# Patient Record
Sex: MALE | NOT HISPANIC OR LATINO
[De-identification: names, ages, dates, MRNs, and addresses within clinical notes are randomized per-mention and may not be internally consistent; named-entity substitution may affect disease eponyms.]

---

## 2020-05-04 ENCOUNTER — APPOINTMENT (OUTPATIENT)
Dept: NEPHROLOGY | Facility: CLINIC | Age: 50
End: 2020-05-04
Payer: COMMERCIAL

## 2020-05-04 DIAGNOSIS — Z82.3 FAMILY HISTORY OF STROKE: ICD-10-CM

## 2020-05-04 DIAGNOSIS — S86.819A STRAIN OF OTHER MUSCLE(S) AND TENDON(S) AT LOWER LEG LEVEL, UNSPECIFIED LEG, INITIAL ENCOUNTER: ICD-10-CM

## 2020-05-04 DIAGNOSIS — Z72.89 OTHER PROBLEMS RELATED TO LIFESTYLE: ICD-10-CM

## 2020-05-04 DIAGNOSIS — Z87.891 PERSONAL HISTORY OF NICOTINE DEPENDENCE: ICD-10-CM

## 2020-05-04 PROCEDURE — 99204 OFFICE O/P NEW MOD 45 MIN: CPT | Mod: 95

## 2020-05-04 RX ORDER — ELECTROLYTES/DEXTROSE
SOLUTION, ORAL ORAL
Refills: 0 | Status: ACTIVE | COMMUNITY

## 2020-05-04 NOTE — ASSESSMENT
[FreeTextEntry1] : Bib Galvin is a 49 year old  male with a history of focal segmental glomerulosclerosis diagnosed by biopsy at around age 15 when he was noted to have proteinuria. His mother opted for a holistic approach.  He was introduced to Dr. Ugo Lane who modified his diet and added supplements.  The proteinuria improved.  Recently he has been having mildly elevated blood pressures.  His first blood pressure tends to run around 140-150/.  Upon repeat it for the third time the pressure decreases to around 135/90.  He does not limit his salt intake.  He has backed off of his exercise regimen since the beginnings of the COVID pandemic.  His BMI is around 27 kg per meter square.\par \par LABS \par \par (11/11/21019)  BUN/creatinine 19/1.2, eGGFR > 60 mL/minute, urinalysis --> 30 mg/dL albumin\par \par (12/09/2019)  24 hour urine collection --> volume 2150 mL, 494 mg/24 h, creatinine 2.9 g/24 h\par \par IMPRESSION:\par \par (1) Mildly elevated blood pressures\par (2) Proteinuria < 0.5 grams\par (3) Overweight?\par \par RECOMMENDATIONS:\par \par (1) I think that Mr. Galvin would best be suited to lifestyle modifications.  I asked him to get back to his regular exercise routine and remove salt from his diet.  In addition, I explained that his BMI of 27 kg./m2 is above normal and commented that I was unsure whether this was due to a substantial musculature or not.  I commented that given that his is a teleconference he is probably better suited to decide whether he needs to lose weight.\par (2) Mr. Galvin will follow his twice daily blood pressures, taking them twice if he does not meet a goal of 130/80.\par (3) We talked about the proteinuria.  I explained that close to 1/2 gram is above normal and commented that the lifestyle changes might very well correct this. I am not adding an ACE inhibitor or ARB at this time. \par \par I will see Mr. Galvin again for follow up in around one month.  At that time I will order lab studies.  I am okay with submitting a random urine for total protein and creatinine when the amount of proteinuria is checked. \par \par The time spent in this teleconference was 45 minutes.\par \par \par

## 2020-05-04 NOTE — HISTORY OF PRESENT ILLNESS
[Medical Office: ([facility])___] : at the medical office located in  [Home] : at home, [unfilled] , at the time of the visit. [Patient] : the patient [FreeTextEntry4] : Lorna Addison [FreeTextEntry1] : Bib Galvin is a 49 year old  male who is currently being referred because of hypertension and recently increasing blood pressures.  He has a history of chronic kidney disease and underwent a renal biopsy when he was about 13-14 years old because of proteinuria.  He was told he had focal glomerulosclerosis.  He opted for a holistic approach and self treated with diet and nutritional supplements.  The proteinuria decreased.  He uses no NSAIDS.  He avoids white flour, limits sugar in his diet, and eats a large vegetable diet.  His weight has been steady, fluctuating between 210 and 215 pounds.  He is 6 foot 2 inches tall.  His blood pressures initially run around 140-150/.  He takes his blood pressure 3 times.  The final measurement read 135/91.  These last measurements were done on 04/12/2020.\par \par He exercises 2-3 times per week (cardio, basketball, weight lifting) and running when not being quarantined due to COVID.  He tries to limit salt in his diet "but not extensively".  He saw Dr. Fazal Benedict on one or two occasions. He then saw Dr. Sher.

## 2020-05-04 NOTE — REVIEW OF SYSTEMS
[Nocturia] : nocturia [Negative] : Neurological [Hesitancy] : no urinary hesitancy [Dysuria] : no dysuria [FreeTextEntry2] : "there a points in time where I'll feel a little run down" [FreeTextEntry8] : nocturia x 1-2, usually once.  [FreeTextEntry9] : Occasional shoulder pain (had some issues and did some rehab), some old sports injuries "that pop up from time to time" [de-identified] : n [de-identified] : no polydipsia, polyphagia, feelings of being very hot or cold

## 2020-05-04 NOTE — CONSULT LETTER
[Dear  ___] : Dear  [unfilled], [Consult Letter:] : I had the pleasure of evaluating your patient, [unfilled]. [Please see my note below.] : Please see my note below. [Consult Closing:] : Thank you very much for allowing me to participate in the care of this patient.  If you have any questions, please do not hesitate to contact me. [Sincerely,] : Sincerely, [FreeTextEntry3] : Khoa

## 2021-02-04 ENCOUNTER — APPOINTMENT (OUTPATIENT)
Dept: NEPHROLOGY | Facility: CLINIC | Age: 51
End: 2021-02-04
Payer: COMMERCIAL

## 2021-02-04 VITALS
SYSTOLIC BLOOD PRESSURE: 132 MMHG | DIASTOLIC BLOOD PRESSURE: 84 MMHG | BODY MASS INDEX: 26.31 KG/M2 | RESPIRATION RATE: 16 BRPM | WEIGHT: 205 LBS | TEMPERATURE: 98 F | HEART RATE: 61 BPM | OXYGEN SATURATION: 98 % | HEIGHT: 74 IN

## 2021-02-04 VITALS — SYSTOLIC BLOOD PRESSURE: 140 MMHG | DIASTOLIC BLOOD PRESSURE: 84 MMHG

## 2021-02-04 PROCEDURE — 99072 ADDL SUPL MATRL&STAF TM PHE: CPT

## 2021-02-04 PROCEDURE — 99214 OFFICE O/P EST MOD 30 MIN: CPT

## 2021-02-04 NOTE — PHYSICAL EXAM
[General Appearance - Alert] : alert [General Appearance - In No Acute Distress] : in no acute distress [Sclera] : the sclera and conjunctiva were normal [Extraocular Movements] : extraocular movements were intact [Neck Appearance] : the appearance of the neck was normal [] : no respiratory distress [Respiration, Rhythm And Depth] : normal respiratory rhythm and effort [Exaggerated Use Of Accessory Muscles For Inspiration] : no accessory muscle use [Auscultation Breath Sounds / Voice Sounds] : lungs were clear to auscultation bilaterally [Heart Rate And Rhythm] : heart rate was normal and rhythm regular [Heart Sounds] : normal S1 and S2 [Heart Sounds Gallop] : no gallops [Murmurs] : no murmurs [Heart Sounds Pericardial Friction Rub] : no pericardial rub [Edema] : there was no peripheral edema [Bowel Sounds] : normal bowel sounds [Abdomen Soft] : soft [Abdomen Tenderness] : non-tender [Abnormal Walk] : normal gait [Skin Color & Pigmentation] : normal skin color and pigmentation [Skin Turgor] : normal skin turgor [Oriented To Time, Place, And Person] : oriented to person, place, and time [Impaired Insight] : insight and judgment were intact [Affect] : the affect was normal [Mood] : the mood was normal

## 2021-02-04 NOTE — ASSESSMENT
[FreeTextEntry1] : Bib Galvin is a 50 year old  male with a history of focal segmental glomerulosclerosis diagnosed by biopsy at around age 15 when he was noted to have proteinuria. His mother opted for a holistic approach.  He was introduced to Dr. Ugo Lane who modified his diet and added supplements.  The proteinuria improved.  He is here regarding his FSGS and hypertension.  Today's blood pressure (taken by MD is 140/84.  The other blood pressure was taken over a shirt.  His blood pressure is mildly elevated.  Sometimes it is higher at home.  His BMI is 26.32 kg per meter square though he is thin and very muscular.\par \par LABS \par \par (11/11/21019)  BUN/creatinine 19/1.2, eGFR > 60 mL/minute, urinalysis --> 30 mg/dL albumin\par \par (12/09/2019)  24 hour urine collection --> volume 2150 mL, 494 mg/24 h, creatinine 2.9 g/24 h\par \par (01/20/2021)  Urinalysis without blood, trace protein is present. BUN/creatinine levels are 17/1.2. \par \par IMPRESSION:\par \par (1) Mildly elevated blood pressures\par (2) Proteinuria < 0.5 gram in the past.  No recent urine protein to creatinine or albumin to creatinine ratio.\par \par RECOMMENDATIONS:\par \par (1) I think that Mr. Galvin would best be suited to lifestyle modifications.  I asked him to get back to his regular exercise routine and remove salt from his diet. \par (2) 24-hour urine for total protein will be done in the near future. \par (3) i am no medications at this time.  If there is any proteinuria, Mr. Galvin might do well on an ARB for renal protection. \par \par I will call Mr. Galvin with the result of the 24 hour urine.\par \par \par

## 2021-02-04 NOTE — REVIEW OF SYSTEMS
[Nocturia] : nocturia [Feeling Tired] : feeling tired [Negative] : Musculoskeletal [Dysuria] : no dysuria [Hesitancy] : no urinary hesitancy [FreeTextEntry2] : Tired (though not today),  not getting enough sleep [FreeTextEntry8] : nocturia x 1-2, usually once.

## 2021-02-04 NOTE — HISTORY OF PRESENT ILLNESS
[FreeTextEntry1] : Bib Galvin is a 50 year old  male who I saw in consult on 05/04/2020 because of hypertension and recently increasing blood pressures.  He reported a history of chronic kidney disease and underwent a renal biopsy when he was about 13-14 years old because of proteinuria.  He was told he had focal glomerulosclerosis.  He opted for a holistic approach and self treated with diet and nutritional supplements.  The proteinuria decreased.  He denies any use of  NSAIDS.  He stated that he avoids white flour, limits sugar in his diet, and eats a large vegetable diet.  His weight had been steady, fluctuating between 210 and 215 pounds.  He is 6 foot 2 inches tall.  \par \par Mr. Galvin is here for follow up.  He was exercising 2-3 times per week (cardio, basketball, weight lifting) and running prior being quarantined due to COVID.  He continues to exercise though has been limited by the pandemic. He saw Dr. Fazal Benedict on one or two occasions. He then saw Dr. Sher.\par \par Mr. Galvin has been doing well.  He feels "pretty good".  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  He is sleeping less than he would like.  His blood pressures have been "okay", running around 130-145/. He tries to keep salt out of his diet though uses it on occasion for seasoning.

## 2022-02-03 ENCOUNTER — APPOINTMENT (OUTPATIENT)
Dept: NEPHROLOGY | Facility: CLINIC | Age: 52
End: 2022-02-03
Payer: COMMERCIAL

## 2022-02-03 VITALS
RESPIRATION RATE: 12 BRPM | BODY MASS INDEX: 26.69 KG/M2 | HEIGHT: 74 IN | SYSTOLIC BLOOD PRESSURE: 142 MMHG | HEART RATE: 60 BPM | WEIGHT: 208 LBS | DIASTOLIC BLOOD PRESSURE: 90 MMHG

## 2022-02-03 VITALS — DIASTOLIC BLOOD PRESSURE: 86 MMHG | SYSTOLIC BLOOD PRESSURE: 136 MMHG

## 2022-02-03 PROCEDURE — 99214 OFFICE O/P EST MOD 30 MIN: CPT

## 2022-02-03 RX ORDER — LOSARTAN POTASSIUM 100 MG/1
100 TABLET, FILM COATED ORAL
Refills: 0 | Status: ACTIVE | COMMUNITY

## 2022-02-03 NOTE — REVIEW OF SYSTEMS
[Dysuria] : no dysuria [Hesitancy] : no urinary hesitancy [Nocturia] : nocturia [As Noted in HPI] : as noted in HPI [Negative] : Heme/Lymph [FreeTextEntry8] : nocturia x 1-2, usually once.

## 2022-02-03 NOTE — HISTORY OF PRESENT ILLNESS
[FreeTextEntry1] : Bib Galvin is a 51-year old  male who I saw in consult on 05/04/2020 because of hypertension and increasing blood pressures.  He reported a history of chronic kidney disease and underwent a renal biopsy when he was about 13-14 years old because of proteinuria.  He was told he had focal glomerulosclerosis.  He opted for a holistic approach and self treated with diet and nutritional supplements.  The proteinuria decreased.  He denied any use of  NSAIDS.  He stated that he avoids white flour, limits sugar in his diet, and eats a large vegetable diet.  His weight had been steady, fluctuating between 210 and 215 pounds.  He is 6 foot 2 inches tall.  \par \par Mr. Galvin is here for follow up.  I last saw him on 02/04/2021. He has been doing well "overall".  He turned 51 in December.  He was told he has arthritis of the hip.  He is hesitant to undergo another surgical procedure (he had repair of a patellar tendon around 10 year ago. He continues to stay active.  He got a [employer] bicycle in the Spring of 2021 and has been using it around 2-3 times per week.  He does some weight work in his house. He stopped running.  His first measurements run around 150/.  After taking 2-3 times the blood pressures decrease to around 135//  His blood pressure in Dr. Maldonado' office was 130/mid-80s.\par \par He saw Dr. Fazal Benedict on one or two occasions. He then saw Dr. Ivis Sher.  \par

## 2022-02-03 NOTE — ASSESSMENT
[FreeTextEntry1] : Bib Galvin is a 51 year old  male with a history of focal segmental glomerulosclerosis diagnosed by biopsy at around age 15 when he was noted to have proteinuria. His mother opted for a holistic approach.  He was introduced to Dr. Ugo Lane who modified his diet and added supplements.  The proteinuria improved.  He is here regarding the FSGS and hypertension.  Today's blood pressure (taken by MD) demonstrates a trending down when he is relaxing.  Still, the pressures are slightly elevated given the underlying renal disease. The other blood pressure was taken over a shirt.  His blood pressure is mildly elevated.  Sometimes it is higher at home.  His BMI is 26.71 kg per square meter though he is thin and very muscular.\par \par LABS \par \par (11/11/21019)  BUN/creatinine 19/1.2, eGFR > 60 mL/minute, urinalysis --> 30 mg/dL albumin\par \par (12/09/2019)  24 hour urine collection --> volume 2150 mL, 494 mg/24 h, creatinine 2.9 g/24 h\par \par (01/20/2021)  Urinalysis without blood, trace protein is present. BUN/creatinine levels are 17/1.2. \par \par (1/25/2022) BUN/creatinine 17/1.2, urinalysis trace protein\par \par IMPRESSION:\par \par (1) Mildly elevated blood pressures\par (2) Proteinuria < 0.5 gram in the past.  No recent urine protein to creatinine or albumin to creatinine ratio.\par \par RECOMMENDATIONS:\par \par (1) Continue the healthy lifestyle\par (2) Avoid salt\par (3) Avoid NSAIDS\par (4) Continue the losartan for renal protection and blood pressure control.\par (5) Mr. Galvin will  commit to exercise 4 times per week on the [employer].  I will hold off on adding any\par additional hypertensives at this point. \par (6) Random urine for total protein, creatinine, and microalbumin\par \par \par I will call Mr. Galvin with the result of the 24 hour urine.\par \par \par

## 2022-02-03 NOTE — PHYSICAL EXAM
[General Appearance - Alert] : alert [General Appearance - In No Acute Distress] : in no acute distress [Sclera] : the sclera and conjunctiva were normal [Extraocular Movements] : extraocular movements were intact [Neck Appearance] : the appearance of the neck was normal [] : no respiratory distress [Respiration, Rhythm And Depth] : normal respiratory rhythm and effort [Exaggerated Use Of Accessory Muscles For Inspiration] : no accessory muscle use [Auscultation Breath Sounds / Voice Sounds] : lungs were clear to auscultation bilaterally [Heart Rate And Rhythm] : heart rate was normal and rhythm regular [Heart Sounds] : normal S1 and S2 [Heart Sounds Gallop] : no gallops [Murmurs] : no murmurs [Heart Sounds Pericardial Friction Rub] : no pericardial rub [Edema] : there was no peripheral edema [Bowel Sounds] : normal bowel sounds [Abdomen Soft] : soft [Abdomen Tenderness] : non-tender [Abnormal Walk] : normal gait [Involuntary Movements] : no involuntary movements were seen [Skin Color & Pigmentation] : normal skin color and pigmentation [Skin Turgor] : normal skin turgor [No Focal Deficits] : no focal deficits [Oriented To Time, Place, And Person] : oriented to person, place, and time [Impaired Insight] : insight and judgment were intact [Affect] : the affect was normal [Mood] : the mood was normal

## 2022-08-04 ENCOUNTER — RESULT REVIEW (OUTPATIENT)
Age: 52
End: 2022-08-04

## 2022-10-21 ENCOUNTER — NON-APPOINTMENT (OUTPATIENT)
Age: 52
End: 2022-10-21

## 2022-11-19 ENCOUNTER — APPOINTMENT (OUTPATIENT)
Dept: AFTER HOURS CARE | Facility: EMERGENCY ROOM | Age: 52
End: 2022-11-19

## 2022-11-19 DIAGNOSIS — U07.1 COVID-19: ICD-10-CM

## 2022-11-19 PROCEDURE — 99204 OFFICE O/P NEW MOD 45 MIN: CPT | Mod: 95

## 2022-11-19 NOTE — REVIEW OF SYSTEMS
[Chills] : chills [Fatigue] : fatigue [Shortness Of Breath] : no shortness of breath [Cough] : cough [Dyspnea on Exertion] : not dyspnea on exertion [Muscle Pain] : muscle pain [Negative] : Psychiatric

## 2022-11-19 NOTE — PHYSICAL EXAM
[No Acute Distress] : no acute distress [Well Nourished] : well nourished [Well Developed] : well developed [Well-Appearing] : well-appearing [Normal Sclera/Conjunctiva] : normal sclera/conjunctiva [EOMI] : extraocular movements intact [Normal Outer Ear/Nose] : the outer ears and nose were normal in appearance [No Respiratory Distress] : no respiratory distress  [No Accessory Muscle Use] : no accessory muscle use [Coordination Grossly Intact] : coordination grossly intact [No Focal Deficits] : no focal deficits [Speech Grossly Normal] : speech grossly normal [Normal Affect] : the affect was normal [Alert and Oriented x3] : oriented to person, place, and time [Normal Insight/Judgement] : insight and judgment were intact

## 2022-11-19 NOTE — ASSESSMENT
[FreeTextEntry1] : 52 yo man with PMHX of HTN and hip surgery 3 weeks ago presents with 2 days of Fatigue, cough, chills and myalgias.  COVID positive today.  Patient is vaccinated and boosted.  Currently takes amlodipine and losartan daily for HTN.  Was placed on ASA and pantoprazole post op temprorarily.  Otherwise well.  No SOB, nausea, vomiting, abd pain, diarrhea, loss of taste or smell.  \par \par Assessment:\par \par COVID-19 mild with low risk of progression\par \par

## 2022-11-19 NOTE — HISTORY OF PRESENT ILLNESS
[Home] : at home, [unfilled] , at the time of the visit. [Other Location: e.g. Home (Enter Location, City,State)___] : at [unfilled] [Verbal consent obtained from patient] : the patient, [unfilled] [FreeTextEntry8] : 50 yo man with PMHX of HTN and hip surgery 3 weeks ago presents with 2 days of Fatigue, cough, chills and myalgias.  COVID positive today.  Patient is vaccinated and boosted.  Currently takes amlodipine and losartan daily for HTN.  Was placed on ASA and pantoprazole post op temprorarily.  Otherwise well.  No SOB, nausea, vomiting, abd pain, diarrhea, loss of taste or smell.

## 2022-11-19 NOTE — PLAN
[FreeTextEntry1] : Plan:\par \par We discussed risks vs. benefits of paxlovid;  patient prefers to take it.  He understands the need to hold his BP meds both for 10 days.  \par Monitor symptoms and pulse ox;  any SOB or pulse ox <94% should prompt an ED visit.\par Quarantine for 5 days from positive test and 72 hours fever free and 10 days of social distancing and mask wearing.

## 2023-02-02 ENCOUNTER — APPOINTMENT (OUTPATIENT)
Dept: NEPHROLOGY | Facility: CLINIC | Age: 53
End: 2023-02-02
Payer: COMMERCIAL

## 2023-02-02 VITALS
DIASTOLIC BLOOD PRESSURE: 78 MMHG | RESPIRATION RATE: 16 BRPM | HEIGHT: 74 IN | SYSTOLIC BLOOD PRESSURE: 130 MMHG | BODY MASS INDEX: 26.44 KG/M2 | WEIGHT: 206 LBS | TEMPERATURE: 98.3 F | HEART RATE: 64 BPM | OXYGEN SATURATION: 97 %

## 2023-02-02 PROCEDURE — 99214 OFFICE O/P EST MOD 30 MIN: CPT

## 2023-02-02 RX ORDER — AMLODIPINE BESYLATE 10 MG/1
10 TABLET ORAL DAILY
Refills: 0 | Status: ACTIVE | COMMUNITY

## 2023-02-02 RX ORDER — NIRMATRELVIR AND RITONAVIR 300-100 MG
20 X 150 MG & KIT ORAL
Qty: 30 | Refills: 0 | Status: DISCONTINUED | COMMUNITY
Start: 2022-11-19 | End: 2023-02-02

## 2023-02-02 NOTE — ASSESSMENT
[FreeTextEntry1] : Bib Galvin is a 52-year old  male with a history of focal segmental glomerulosclerosis diagnosed by biopsy at around age 15 when he was noted to have proteinuria. His mother opted for a holistic approach.  He was introduced to Dr. Ugo Lane who modified his diet and added supplements.  The proteinuria improved.  He is here regarding the FSGS and hypertension.  Today's blood pressure (taken by MD) demonstrates a trending down when he is relaxing.  Still, the pressures are slightly elevated given the underlying renal disease. The other blood pressure was taken over a shirt.  His blood pressure is mildly elevated.  Sometimes it is higher at home.  His BMI is 26.71 kg per square meter though he is thin and very muscular.\par \par LABS \par \par (11/11/21019)  BUN/creatinine 19/1.2, eGFR > 60 mL/minute, urinalysis --> 30 mg/dL albumin\par \par (12/09/2019)  24 hour urine collection --> volume 2150 mL, 494 mg/24 h, creatinine 2.9 g/24 h\par \par (01/20/2021)  Urinalysis without blood, trace protein is present. BUN/creatinine levels are 17/1.2. \par \par (1/25/2022) BUN/creatinine 17/1.2, urinalysis trace protein\par \par (01/05/202) BUN/creatinine 16/1.2, urinalysis protein 30 mg/dL\par \par IMPRESSION:\par \par (1) Hypertension. Good control.  Recently got back to exercising. Should improve with continued exercise.\par (2) Proteinuria < 0.5 gram in the past.  No recent urine protein to creatinine or albumin to creatinine ratio.\par (3) Elevated transaminases.  Scheduled to repeat lab studies. \par \par RECOMMENDATIONS:\par \par (1) Continue the healthy lifestyle\par (2) Avoid salt\par (3) Avoid NSAIDS\par (4) Continue the losartan for renal protection and blood pressure control.\par (5) Mr. Galvin NEEDS a random urine for albumin, total protein, and creatinine twice yearly.\par \par \par I will call Mr. Galvin with the result of the 24 hour urine.\par \par \par

## 2023-02-02 NOTE — HISTORY OF PRESENT ILLNESS
[FreeTextEntry1] : Bib Galvin is a 52-year old  male who I saw in consult on 05/04/2020 because of hypertension and increasing blood pressures.  He reported a history of chronic kidney disease and underwent a renal biopsy when he was areound  13-14 years old because of proteinuria.  He was told he had focal glomerulosclerosis.  He opted for a holistic approach and self treated with diet and nutritional supplements.  The proteinuria decreased.  He denied any use of  NSAIDS.  He stated that he avoids white flour, limits sugar in his diet, and eats a large vegetable diet.  His weight had been steady, fluctuating between 210 and 215 pounds.  He is 6 foot 2 inches tall.  \par \par Mr. Galvin is here for follow up.  I last saw him on 02/21/2022.  He has been doing well "overall".  He underwent a right replacement at [facility] in 10/2022.  He is active again. He started using his [employer] bicycle twice weekly around 1 month ago.  He also has been going to rehab twice weekly.   His blood pressures have been 125-132/78-85.  \par \par He saw Dr. Fazal Benedict on one or two occasions, and then saw  Dr. Ivis Sher in the past. \par

## 2023-02-02 NOTE — REVIEW OF SYSTEMS
[Nocturia] : nocturia [As Noted in HPI] : as noted in HPI [Dysuria] : no dysuria [Hesitancy] : no urinary hesitancy [Negative] : Musculoskeletal [FreeTextEntry8] : nocturia x 1-3, usually gets up once, depends on PO fluid.  Urinary stream "isn't as strong....overnight"

## 2023-02-02 NOTE — CONSULT LETTER
[Consult Letter:] : I had the pleasure of evaluating your patient, [unfilled]. [Please see my note below.] : Please see my note below. [Consult Closing:] : Thank you very much for allowing me to participate in the care of this patient.  If you have any questions, please do not hesitate to contact me. [Sincerely,] : Sincerely, [Dear  ___] : Dear  [unfilled], [FreeTextEntry3] : Khoa

## 2023-02-02 NOTE — PHYSICAL EXAM
[General Appearance - Alert] : alert [General Appearance - In No Acute Distress] : in no acute distress [Extraocular Movements] : extraocular movements were intact [Sclera] : the sclera and conjunctiva were normal [Neck Appearance] : the appearance of the neck was normal [] : no respiratory distress [Respiration, Rhythm And Depth] : normal respiratory rhythm and effort [Exaggerated Use Of Accessory Muscles For Inspiration] : no accessory muscle use [Auscultation Breath Sounds / Voice Sounds] : lungs were clear to auscultation bilaterally [Heart Rate And Rhythm] : heart rate was normal and rhythm regular [Heart Sounds] : normal S1 and S2 [Heart Sounds Gallop] : no gallops [Murmurs] : no murmurs [Heart Sounds Pericardial Friction Rub] : no pericardial rub [Edema] : there was no peripheral edema [Abdomen Soft] : soft [Bowel Sounds] : normal bowel sounds [Abdomen Tenderness] : non-tender [Abnormal Walk] : normal gait [Involuntary Movements] : no involuntary movements were seen [Skin Color & Pigmentation] : normal skin color and pigmentation [Skin Turgor] : normal skin turgor [No Focal Deficits] : no focal deficits [Oriented To Time, Place, And Person] : oriented to person, place, and time [Impaired Insight] : insight and judgment were intact [Affect] : the affect was normal [Mood] : the mood was normal

## 2023-03-06 ENCOUNTER — NON-APPOINTMENT (OUTPATIENT)
Age: 53
End: 2023-03-06

## 2024-02-12 ENCOUNTER — APPOINTMENT (OUTPATIENT)
Dept: NEPHROLOGY | Facility: CLINIC | Age: 54
End: 2024-02-12
Payer: COMMERCIAL

## 2024-02-12 VITALS
HEIGHT: 74 IN | SYSTOLIC BLOOD PRESSURE: 134 MMHG | OXYGEN SATURATION: 98 % | RESPIRATION RATE: 18 BRPM | BODY MASS INDEX: 26.82 KG/M2 | WEIGHT: 209 LBS | TEMPERATURE: 97.7 F | DIASTOLIC BLOOD PRESSURE: 76 MMHG | HEART RATE: 66 BPM

## 2024-02-12 DIAGNOSIS — R80.9 PROTEINURIA, UNSPECIFIED: ICD-10-CM

## 2024-02-12 DIAGNOSIS — N18.1 CHRONIC KIDNEY DISEASE, STAGE 1: ICD-10-CM

## 2024-02-12 DIAGNOSIS — N05.1 UNSPECIFIED NEPHRITIC SYNDROME WITH FOCAL AND SEGMENTAL GLOMERULAR LESIONS: ICD-10-CM

## 2024-02-12 PROCEDURE — 99214 OFFICE O/P EST MOD 30 MIN: CPT

## 2024-02-12 NOTE — ASSESSMENT
[FreeTextEntry1] : Bib Galvin is a 52-year old  male with a history of focal segmental glomerulosclerosis diagnosed by biopsy at around age 15 when he was noted to have proteinuria. His mother opted for a holistic approach. He was introduced to Dr. Ugo Lane who modified his diet and added supplements. The proteinuria improved. He is here regarding the FSGS and hypertension. Today's blood pressure (taken by MD) demonstrates a trending down when he is relaxing. Still, the pressures are slightly elevated given the underlying renal disease. The other blood pressure was taken over a shirt. His blood pressure is mildly elevated. Sometimes it is higher at home. His BMI is 26.71 kg per square meter though he is thin and very muscular.  LABS  (11/11/2019) BUN/creatinine 19/1.2, eGFR > 60 mL/minute, urinalysis --> 30 mg/dL albumin  (12/09/2019) 24 hour urine collection --> volume 2150 mL, 494 mg/24 h, creatinine 2.9 g/24 h  (01/20/2021) Urinalysis without blood, trace protein is present. BUN/creatinine levels are 17/1.2.  (1/25/2022) BUN/creatinine 17/1.2, urinalysis trace protein  (01/05/202) BUN/creatinine 16/1.2, urinalysis protein 30 mg/dL  (05/22/2023) BUN/creatinine 17/1.2,   IMPRESSION:  (1) Hypertension. Mildly elevated blood pressure.   (2) Proteinuria < 0.5 gram in the past. No recent urine protein to creatinine or albumin to creatinine ratio.  (3) Elevated transaminases. Scheduled to repeat lab studies.    RECOMMENDATIONS:  (1) Continue the healthy lifestyle (2) Avoid salt (3) Avoid NSAIDS (4) Continue the losartan for renal protection and blood pressure control. (5) I ordered a random urine for albumin, total protein, creatinine and a CMP to be done soon. (6) Random urine studies in 6 months. (7) If all is stable I will see Mr. Galvin back next year.

## 2024-02-12 NOTE — HISTORY OF PRESENT ILLNESS
[FreeTextEntry1] : Bib Galvin is a 53-year-old male with a history of focal segmental glomerulosclerosis diagnosed by biopsy at around age 15 when he was noted to have proteinuria. His mother opted for a holistic approach. He was introduced to Dr. Ugo Lane who modified his diet and added supplements. The proteinuria improved. He is here for follow up regarding the FSGS and hypertension.  I last saw Mr. Galvin on 02/02/2023.  He reports he has been well over the past year.

## 2024-02-12 NOTE — REVIEW OF SYSTEMS
[Negative] : Heme/Lymph [FreeTextEntry4] : sinus congestion [FreeTextEntry6] : cough on occasion "part of the whole sinus thing I was dealing with a couple of weeks ago"- resolved "for the most part" [FreeTextEntry8] : nocturia x 1-2.  [FreeTextEntry9] : some joint pain (knee), stiffness in hip (side he had surgery)

## 2024-03-12 ENCOUNTER — APPOINTMENT (OUTPATIENT)
Dept: PULMONOLOGY | Facility: CLINIC | Age: 54
End: 2024-03-12
Payer: COMMERCIAL

## 2024-03-12 VITALS — WEIGHT: 209 LBS | HEIGHT: 74 IN | HEART RATE: 71 BPM | OXYGEN SATURATION: 97 % | BODY MASS INDEX: 26.82 KG/M2

## 2024-03-12 DIAGNOSIS — R06.83 SNORING: ICD-10-CM

## 2024-03-12 DIAGNOSIS — I10 ESSENTIAL (PRIMARY) HYPERTENSION: ICD-10-CM

## 2024-03-12 PROCEDURE — 99203 OFFICE O/P NEW LOW 30 MIN: CPT

## 2024-03-12 NOTE — HISTORY OF PRESENT ILLNESS
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  Patient is sleepy with [place] sleepiness score of 10.  Patient has very loud snoring which disturbs his wife, does not have any witnessed apneas.  Patient's bedtime is around 12.30 AM wakes up in the morning around 7.30 AM.   He feels rested when he wakes up.  Patient drinks 1 cup of coffee during the daytime. Patient does not have any headaches or nocturia. He is sleepy while driving long drives. STOPBANG score - 5 neck size - 16 and half inches

## 2024-03-12 NOTE — REVIEW OF SYSTEMS
[EDS: ESS=____] : daytime somnolence: ESS=[unfilled] [Snoring] : snoring [Witnessed Apneas] : witnessed apnea [Negative] : Musculoskeletal

## 2024-03-12 NOTE — PHYSICAL EXAM
[General Appearance - Well Nourished] : well nourished [General Appearance - Well Developed] : well developed [Enlarged Base of the Tongue] : enlargement of the base of the tongue [III] : III [Heart Sounds] : normal S1 and S2 [] : no respiratory distress [Murmurs] : no murmurs [Auscultation Breath Sounds / Voice Sounds] : lungs were clear to auscultation bilaterally [No Focal Deficits] : no focal deficits [Oriented To Time, Place, And Person] : oriented to person, place, and time [Memory Recent] : recent memory was not impaired [FreeTextEntry1] : no edema

## 2024-03-12 NOTE — ASSESSMENT
[FreeTextEntry1] :  53 year  year old M presenting with symptoms of WINNIE. Today we reviewed the pathophysiology of this condition including the increased risk of neurocognitive, metabolic and cardiovascular complications. I explained that dilating muscles that normally maintain the airway open during wakefulness are less active in sleep and that the diameter of the upper airway normally decreases in sleep. Furthermore I explained that airway compromise is typically followed by an arousal which the patient may or may not be aware of. We discussed how WINNIE is diagnosed and briefly what treatment options are available.

## 2024-03-22 ENCOUNTER — NON-APPOINTMENT (OUTPATIENT)
Age: 54
End: 2024-03-22

## 2024-03-22 LAB
ALBUMIN SERPL ELPH-MCNC: 4.2 G/DL
ALP BLD-CCNC: 76 U/L
ALT SERPL-CCNC: 32 U/L
ANION GAP SERPL CALC-SCNC: 12 MMOL/L
APPEARANCE: CLEAR
AST SERPL-CCNC: 31 U/L
BACTERIA: NEGATIVE /HPF
BILIRUB SERPL-MCNC: 0.4 MG/DL
BILIRUBIN URINE: NEGATIVE
BLOOD URINE: NEGATIVE
BUN SERPL-MCNC: 17 MG/DL
CALCIUM SERPL-MCNC: 9.3 MG/DL
CAST: 2 /LPF
CHLORIDE SERPL-SCNC: 106 MMOL/L
CO2 SERPL-SCNC: 24 MMOL/L
COLOR: YELLOW
CREAT SERPL-MCNC: 1.21 MG/DL
CREAT SPEC-SCNC: 136 MG/DL
CREAT SPEC-SCNC: 136 MG/DL
CREAT/PROT UR: 0.1 RATIO
EGFR: 72 ML/MIN/1.73M2
EPITHELIAL CELLS: 0 /HPF
GLUCOSE QUALITATIVE U: NEGATIVE MG/DL
GLUCOSE SERPL-MCNC: 78 MG/DL
KETONES URINE: NEGATIVE MG/DL
LEUKOCYTE ESTERASE URINE: NEGATIVE
MICROALBUMIN 24H UR DL<=1MG/L-MCNC: 4.8 MG/DL
MICROALBUMIN/CREAT 24H UR-RTO: 36 MG/G
MICROSCOPIC-UA: NORMAL
NITRITE URINE: NEGATIVE
PH URINE: 6.5
POTASSIUM SERPL-SCNC: 4.7 MMOL/L
PROT SERPL-MCNC: 7.3 G/DL
PROT UR-MCNC: 17 MG/DL
PROTEIN URINE: NEGATIVE MG/DL
RED BLOOD CELLS URINE: 0 /HPF
SODIUM SERPL-SCNC: 142 MMOL/L
SPECIFIC GRAVITY URINE: 1.02
UROBILINOGEN URINE: 0.2 MG/DL
WHITE BLOOD CELLS URINE: 0 /HPF

## 2024-04-09 ENCOUNTER — NON-APPOINTMENT (OUTPATIENT)
Age: 54
End: 2024-04-09

## 2024-04-09 DIAGNOSIS — G47.33 OBSTRUCTIVE SLEEP APNEA (ADULT) (PEDIATRIC): ICD-10-CM

## 2024-12-24 PROBLEM — F10.90 ALCOHOL USE: Status: ACTIVE | Noted: 2020-05-04

## 2025-02-10 ENCOUNTER — APPOINTMENT (OUTPATIENT)
Dept: NEPHROLOGY | Facility: CLINIC | Age: 55
End: 2025-02-10

## 2025-03-31 ENCOUNTER — APPOINTMENT (OUTPATIENT)
Dept: NEPHROLOGY | Facility: CLINIC | Age: 55
End: 2025-03-31

## 2025-06-09 ENCOUNTER — APPOINTMENT (OUTPATIENT)
Dept: NEPHROLOGY | Facility: CLINIC | Age: 55
End: 2025-06-09
Payer: COMMERCIAL

## 2025-06-09 ENCOUNTER — APPOINTMENT (OUTPATIENT)
Dept: HEART AND VASCULAR | Facility: CLINIC | Age: 55
End: 2025-06-09
Payer: COMMERCIAL

## 2025-06-09 VITALS
WEIGHT: 214 LBS | DIASTOLIC BLOOD PRESSURE: 76 MMHG | BODY MASS INDEX: 27.46 KG/M2 | HEART RATE: 57 BPM | HEIGHT: 74 IN | OXYGEN SATURATION: 96 % | RESPIRATION RATE: 18 BRPM | TEMPERATURE: 98.2 F | SYSTOLIC BLOOD PRESSURE: 118 MMHG

## 2025-06-09 PROBLEM — R10.9 ABDOMINAL PAIN: Status: ACTIVE | Noted: 2025-06-09

## 2025-06-09 PROCEDURE — 99214 OFFICE O/P EST MOD 30 MIN: CPT

## 2025-06-09 PROCEDURE — 36415 COLL VENOUS BLD VENIPUNCTURE: CPT

## 2025-06-13 LAB
ALBUMIN SERPL ELPH-MCNC: 4.1 G/DL
ALP BLD-CCNC: 70 U/L
ALT SERPL-CCNC: 29 U/L
ANION GAP SERPL CALC-SCNC: 12 MMOL/L
APPEARANCE: CLEAR
AST SERPL-CCNC: 45 U/L
BACTERIA: NEGATIVE /HPF
BASOPHILS # BLD AUTO: 0.02 K/UL
BASOPHILS NFR BLD AUTO: 0.4 %
BILIRUB SERPL-MCNC: 0.4 MG/DL
BILIRUBIN URINE: NEGATIVE
BLOOD URINE: NEGATIVE
BUN SERPL-MCNC: 19 MG/DL
CALCIUM SERPL-MCNC: 9.6 MG/DL
CAST: 0 /LPF
CHLORIDE SERPL-SCNC: 104 MMOL/L
CO2 SERPL-SCNC: 23 MMOL/L
COLOR: YELLOW
CREAT SERPL-MCNC: 1.25 MG/DL
CREAT SPEC-SCNC: 180 MG/DL
CREAT SPEC-SCNC: 180 MG/DL
CREAT/PROT UR: 0.1 RATIO
EGFRCR SERPLBLD CKD-EPI 2021: 69 ML/MIN/1.73M2
EOSINOPHIL # BLD AUTO: 0.09 K/UL
EOSINOPHIL NFR BLD AUTO: 1.8 %
EPITHELIAL CELLS: 0 /HPF
GLUCOSE QUALITATIVE U: NEGATIVE MG/DL
GLUCOSE SERPL-MCNC: 83 MG/DL
HCT VFR BLD CALC: 42.5 %
HGB BLD-MCNC: 13.2 G/DL
IMM GRANULOCYTES NFR BLD AUTO: 0.2 %
KETONES URINE: NEGATIVE MG/DL
LEUKOCYTE ESTERASE URINE: NEGATIVE
LYMPHOCYTES # BLD AUTO: 2.47 K/UL
LYMPHOCYTES NFR BLD AUTO: 48.9 %
MAN DIFF?: NORMAL
MCHC RBC-ENTMCNC: 25.4 PG
MCHC RBC-ENTMCNC: 31.1 G/DL
MCV RBC AUTO: 81.7 FL
MICROALBUMIN 24H UR DL<=1MG/L-MCNC: 8.7 MG/DL
MICROALBUMIN/CREAT 24H UR-RTO: 49 MG/G
MICROSCOPIC-UA: NORMAL
MONOCYTES # BLD AUTO: 0.41 K/UL
MONOCYTES NFR BLD AUTO: 8.1 %
NEUTROPHILS # BLD AUTO: 2.05 K/UL
NEUTROPHILS NFR BLD AUTO: 40.6 %
NITRITE URINE: NEGATIVE
PH URINE: 6
PLATELET # BLD AUTO: 296 K/UL
POTASSIUM SERPL-SCNC: 4.4 MMOL/L
PROT SERPL-MCNC: 7.7 G/DL
PROT UR-MCNC: 24 MG/DL
PROTEIN URINE: 30 MG/DL
RBC # BLD: 5.2 M/UL
RBC # FLD: 13.9 %
RED BLOOD CELLS URINE: 0 /HPF
SODIUM SERPL-SCNC: 139 MMOL/L
SPECIFIC GRAVITY URINE: 1.02
UROBILINOGEN URINE: 1 MG/DL
WBC # FLD AUTO: 5.05 K/UL
WHITE BLOOD CELLS URINE: 0 /HPF

## 2025-06-26 ENCOUNTER — NON-APPOINTMENT (OUTPATIENT)
Age: 55
End: 2025-06-26

## 2025-07-08 ENCOUNTER — NON-APPOINTMENT (OUTPATIENT)
Age: 55
End: 2025-07-08

## 2025-07-11 ENCOUNTER — RESULT REVIEW (OUTPATIENT)
Age: 55
End: 2025-07-11

## 2025-08-12 DIAGNOSIS — R93.429 ABNORMAL RADIOLOGIC FINDINGS ON DIAGNOSITIC IMAGING OF UNSPECIFIED KIDNEY: ICD-10-CM

## 2025-08-14 ENCOUNTER — NON-APPOINTMENT (OUTPATIENT)
Age: 55
End: 2025-08-14

## 2025-09-09 DIAGNOSIS — M79.672 PAIN IN LEFT FOOT: ICD-10-CM

## 2025-09-12 ENCOUNTER — APPOINTMENT (OUTPATIENT)
Facility: CLINIC | Age: 55
End: 2025-09-12
Payer: COMMERCIAL

## 2025-09-12 ENCOUNTER — RESULT REVIEW (OUTPATIENT)
Age: 55
End: 2025-09-12

## 2025-09-12 VITALS
DIASTOLIC BLOOD PRESSURE: 87 MMHG | SYSTOLIC BLOOD PRESSURE: 142 MMHG | BODY MASS INDEX: 27.46 KG/M2 | RESPIRATION RATE: 16 BRPM | TEMPERATURE: 97.3 F | OXYGEN SATURATION: 97 % | HEIGHT: 74 IN | WEIGHT: 214 LBS | HEART RATE: 64 BPM

## 2025-09-12 DIAGNOSIS — M72.2 PLANTAR FASCIAL FIBROMATOSIS: ICD-10-CM

## 2025-09-12 PROCEDURE — 99203 OFFICE O/P NEW LOW 30 MIN: CPT

## 2025-09-12 PROCEDURE — G2211 COMPLEX E/M VISIT ADD ON: CPT | Mod: NC
